# Patient Record
Sex: MALE | Race: ASIAN
[De-identification: names, ages, dates, MRNs, and addresses within clinical notes are randomized per-mention and may not be internally consistent; named-entity substitution may affect disease eponyms.]

---

## 2019-02-26 ENCOUNTER — HOSPITAL ENCOUNTER (OUTPATIENT)
Dept: HOSPITAL 61 - US | Age: 24
Discharge: HOME | End: 2019-02-26
Attending: FAMILY MEDICINE
Payer: COMMERCIAL

## 2019-02-26 DIAGNOSIS — I10: Primary | ICD-10-CM

## 2019-02-26 PROCEDURE — 93975 VASCULAR STUDY: CPT

## 2019-02-26 NOTE — RAD
EXAM: Gray scale and color Doppler renal artery sonogram.

 

HISTORY: Hypertension.

 

TECHNIQUE: Gray scale and color Doppler sonographic imaging the kidneys 

and renal arteries with spectral waveform analysis was performed.

 

COMPARISON: None.

 

FINDINGS: The right kidney measures 11.9 cm [id number]. The left kidney 

measures 13.8 cm [id number]. No solid or cystic renal lesion is seen. 

There is no hydronephrosis. The aorta is normal in caliber. The peak 

systolic velocity within the right renal artery measures 216 cm/s. The 

peak systolic velocity within the left renal artery measures 138 cm/s. 

There are normal renal artery to aorta velocity ratios.

 

IMPRESSION:

1. Elevated peak systolic velocity within the right renal artery are 216 

cm/s. This suggests greater than 60% stenosis.

2. No Doppler evidence of greater than 60% stenosis within the left renal 

artery.

 

Electronically signed by: Luz Maria Calvo MD (2/26/2019 12:58 PM) [id number]